# Patient Record
Sex: MALE | Race: OTHER | NOT HISPANIC OR LATINO | URBAN - METROPOLITAN AREA
[De-identification: names, ages, dates, MRNs, and addresses within clinical notes are randomized per-mention and may not be internally consistent; named-entity substitution may affect disease eponyms.]

---

## 2017-09-14 PROBLEM — Z00.00 ENCOUNTER FOR PREVENTIVE HEALTH EXAMINATION: Status: ACTIVE | Noted: 2017-09-14

## 2019-04-13 ENCOUNTER — EMERGENCY (EMERGENCY)
Facility: HOSPITAL | Age: 44
LOS: 1 days | Discharge: HOME | End: 2019-04-13
Attending: EMERGENCY MEDICINE
Payer: COMMERCIAL

## 2019-04-13 VITALS
RESPIRATION RATE: 16 BRPM | OXYGEN SATURATION: 98 % | TEMPERATURE: 98 F | SYSTOLIC BLOOD PRESSURE: 130 MMHG | HEART RATE: 66 BPM | DIASTOLIC BLOOD PRESSURE: 78 MMHG

## 2019-04-13 VITALS
HEART RATE: 90 BPM | OXYGEN SATURATION: 98 % | DIASTOLIC BLOOD PRESSURE: 76 MMHG | RESPIRATION RATE: 20 BRPM | SYSTOLIC BLOOD PRESSURE: 124 MMHG | WEIGHT: 169.98 LBS | TEMPERATURE: 98 F

## 2019-04-13 LAB
ALBUMIN SERPL ELPH-MCNC: 4.9 G/DL — SIGNIFICANT CHANGE UP (ref 3.3–5.2)
ALP SERPL-CCNC: 88 U/L — SIGNIFICANT CHANGE UP (ref 40–120)
ALT FLD-CCNC: 19 U/L — SIGNIFICANT CHANGE UP
ANION GAP SERPL CALC-SCNC: 14 MMOL/L — SIGNIFICANT CHANGE UP (ref 5–17)
APPEARANCE UR: CLEAR — SIGNIFICANT CHANGE UP
APTT BLD: 31.4 SEC — SIGNIFICANT CHANGE UP (ref 27.5–36.3)
AST SERPL-CCNC: 21 U/L — SIGNIFICANT CHANGE UP
BACTERIA # UR AUTO: ABNORMAL
BASOPHILS # BLD AUTO: 0 K/UL — SIGNIFICANT CHANGE UP (ref 0–0.2)
BASOPHILS NFR BLD AUTO: 0.2 % — SIGNIFICANT CHANGE UP (ref 0–2)
BILIRUB SERPL-MCNC: 0.8 MG/DL — SIGNIFICANT CHANGE UP (ref 0.4–2)
BILIRUB UR-MCNC: NEGATIVE — SIGNIFICANT CHANGE UP
BUN SERPL-MCNC: 16 MG/DL — SIGNIFICANT CHANGE UP (ref 8–20)
CALCIUM SERPL-MCNC: 9.6 MG/DL — SIGNIFICANT CHANGE UP (ref 8.6–10.2)
CHLORIDE SERPL-SCNC: 101 MMOL/L — SIGNIFICANT CHANGE UP (ref 98–107)
CO2 SERPL-SCNC: 25 MMOL/L — SIGNIFICANT CHANGE UP (ref 22–29)
COLOR SPEC: YELLOW — SIGNIFICANT CHANGE UP
CREAT SERPL-MCNC: 0.99 MG/DL — SIGNIFICANT CHANGE UP (ref 0.5–1.3)
DIFF PNL FLD: NEGATIVE — SIGNIFICANT CHANGE UP
EOSINOPHIL # BLD AUTO: 0 K/UL — SIGNIFICANT CHANGE UP (ref 0–0.5)
EOSINOPHIL NFR BLD AUTO: 0.4 % — SIGNIFICANT CHANGE UP (ref 0–5)
EPI CELLS # UR: SIGNIFICANT CHANGE UP
GLUCOSE SERPL-MCNC: 95 MG/DL — SIGNIFICANT CHANGE UP (ref 70–115)
GLUCOSE UR QL: NEGATIVE MG/DL — SIGNIFICANT CHANGE UP
HCT VFR BLD CALC: 44.8 % — SIGNIFICANT CHANGE UP (ref 42–52)
HGB BLD-MCNC: 15.3 G/DL — SIGNIFICANT CHANGE UP (ref 14–18)
INR BLD: 1.25 RATIO — HIGH (ref 0.88–1.16)
KETONES UR-MCNC: ABNORMAL
LEUKOCYTE ESTERASE UR-ACNC: NEGATIVE — SIGNIFICANT CHANGE UP
LYMPHOCYTES # BLD AUTO: 1.8 K/UL — SIGNIFICANT CHANGE UP (ref 1–4.8)
LYMPHOCYTES # BLD AUTO: 19 % — LOW (ref 20–55)
MCHC RBC-ENTMCNC: 31.6 PG — HIGH (ref 27–31)
MCHC RBC-ENTMCNC: 34.2 G/DL — SIGNIFICANT CHANGE UP (ref 32–36)
MCV RBC AUTO: 92.6 FL — SIGNIFICANT CHANGE UP (ref 80–94)
MONOCYTES # BLD AUTO: 1.2 K/UL — HIGH (ref 0–0.8)
MONOCYTES NFR BLD AUTO: 12.5 % — HIGH (ref 3–10)
NEUTROPHILS # BLD AUTO: 6.4 K/UL — SIGNIFICANT CHANGE UP (ref 1.8–8)
NEUTROPHILS NFR BLD AUTO: 67.7 % — SIGNIFICANT CHANGE UP (ref 37–73)
NITRITE UR-MCNC: NEGATIVE — SIGNIFICANT CHANGE UP
PH UR: 6 — SIGNIFICANT CHANGE UP (ref 5–8)
PLATELET # BLD AUTO: 248 K/UL — SIGNIFICANT CHANGE UP (ref 150–400)
POTASSIUM SERPL-MCNC: 4.3 MMOL/L — SIGNIFICANT CHANGE UP (ref 3.5–5.3)
POTASSIUM SERPL-SCNC: 4.3 MMOL/L — SIGNIFICANT CHANGE UP (ref 3.5–5.3)
PROT SERPL-MCNC: 7.9 G/DL — SIGNIFICANT CHANGE UP (ref 6.6–8.7)
PROT UR-MCNC: 15 MG/DL
PROTHROM AB SERPL-ACNC: 14.5 SEC — HIGH (ref 10–12.9)
RBC # BLD: 4.84 M/UL — SIGNIFICANT CHANGE UP (ref 4.6–6.2)
RBC # FLD: 12.3 % — SIGNIFICANT CHANGE UP (ref 11–15.6)
RBC CASTS # UR COMP ASSIST: SIGNIFICANT CHANGE UP /HPF (ref 0–4)
SODIUM SERPL-SCNC: 140 MMOL/L — SIGNIFICANT CHANGE UP (ref 135–145)
SP GR SPEC: 1.01 — SIGNIFICANT CHANGE UP (ref 1.01–1.02)
TROPONIN T SERPL-MCNC: <0.01 NG/ML — SIGNIFICANT CHANGE UP (ref 0–0.06)
UROBILINOGEN FLD QL: NEGATIVE MG/DL — SIGNIFICANT CHANGE UP
WBC # BLD: 9.4 K/UL — SIGNIFICANT CHANGE UP (ref 4.8–10.8)
WBC # FLD AUTO: 9.4 K/UL — SIGNIFICANT CHANGE UP (ref 4.8–10.8)
WBC UR QL: SIGNIFICANT CHANGE UP

## 2019-04-13 PROCEDURE — 70450 CT HEAD/BRAIN W/O DYE: CPT | Mod: 26

## 2019-04-13 PROCEDURE — 85730 THROMBOPLASTIN TIME PARTIAL: CPT

## 2019-04-13 PROCEDURE — 93005 ELECTROCARDIOGRAM TRACING: CPT

## 2019-04-13 PROCEDURE — 84484 ASSAY OF TROPONIN QUANT: CPT

## 2019-04-13 PROCEDURE — 99284 EMERGENCY DEPT VISIT MOD MDM: CPT

## 2019-04-13 PROCEDURE — 93010 ELECTROCARDIOGRAM REPORT: CPT

## 2019-04-13 PROCEDURE — 81001 URINALYSIS AUTO W/SCOPE: CPT

## 2019-04-13 PROCEDURE — 85610 PROTHROMBIN TIME: CPT

## 2019-04-13 PROCEDURE — 71045 X-RAY EXAM CHEST 1 VIEW: CPT

## 2019-04-13 PROCEDURE — 85027 COMPLETE CBC AUTOMATED: CPT

## 2019-04-13 PROCEDURE — 36415 COLL VENOUS BLD VENIPUNCTURE: CPT

## 2019-04-13 PROCEDURE — 70450 CT HEAD/BRAIN W/O DYE: CPT

## 2019-04-13 PROCEDURE — 71045 X-RAY EXAM CHEST 1 VIEW: CPT | Mod: 26

## 2019-04-13 PROCEDURE — 99284 EMERGENCY DEPT VISIT MOD MDM: CPT | Mod: 25

## 2019-04-13 PROCEDURE — 80053 COMPREHEN METABOLIC PANEL: CPT

## 2019-04-13 RX ORDER — SODIUM CHLORIDE 9 MG/ML
1000 INJECTION INTRAMUSCULAR; INTRAVENOUS; SUBCUTANEOUS ONCE
Qty: 0 | Refills: 0 | Status: COMPLETED | OUTPATIENT
Start: 2019-04-13 | End: 2019-04-13

## 2019-04-13 RX ORDER — SODIUM CHLORIDE 9 MG/ML
3 INJECTION INTRAMUSCULAR; INTRAVENOUS; SUBCUTANEOUS ONCE
Qty: 0 | Refills: 0 | Status: COMPLETED | OUTPATIENT
Start: 2019-04-13 | End: 2019-04-13

## 2019-04-13 RX ORDER — MECLIZINE HCL 12.5 MG
25 TABLET ORAL ONCE
Qty: 0 | Refills: 0 | Status: COMPLETED | OUTPATIENT
Start: 2019-04-13 | End: 2019-04-13

## 2019-04-13 RX ADMIN — Medication 25 MILLIGRAM(S): at 15:55

## 2019-04-13 RX ADMIN — SODIUM CHLORIDE 1000 MILLILITER(S): 9 INJECTION INTRAMUSCULAR; INTRAVENOUS; SUBCUTANEOUS at 14:30

## 2019-04-13 RX ADMIN — SODIUM CHLORIDE 1000 MILLILITER(S): 9 INJECTION INTRAMUSCULAR; INTRAVENOUS; SUBCUTANEOUS at 13:45

## 2019-04-13 RX ADMIN — SODIUM CHLORIDE 3 MILLILITER(S): 9 INJECTION INTRAMUSCULAR; INTRAVENOUS; SUBCUTANEOUS at 15:56

## 2019-04-13 NOTE — ED ADULT TRIAGE NOTE - CHIEF COMPLAINT QUOTE
pt reports dizziness and tingling to his hand during the night and states he thinks he is "saying things backwards." pt states symptoms x few days. pt is AOX3, no chest pain no tingling to extremities at this time. pt ambulatory with steady gait and no slurred speech.

## 2019-04-13 NOTE — ED STATDOCS - PROGRESS NOTE DETAILS
Pt signed out by HAY Maddox pending CT and EKG. Pt feeling better labs and CT wnl. Will dc with PCP f/u.

## 2019-04-13 NOTE — ED STATDOCS - ATTENDING CONTRIBUTION TO CARE
I, Maico Mixon, performed the initial face to face bedside interview with this patient regarding history of present illness, review of symptoms and relevant past medical, social and family history.  I completed an independent physical examination.  I was the initial provider who evaluated this patient. I have signed out the follow up of any pending tests (i.e. labs, radiological studies) to the ACP.  I have communicated the patient’s plan of care and disposition with the ACP.  The history, relevant review of systems, past medical and surgical history, medical decision making, and physical examination was documented by the scribe in my presence and I attest to the accuracy of the documentation.
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
## 2019-04-13 NOTE — ED STATDOCS - CLINICAL SUMMARY MEDICAL DECISION MAKING FREE TEXT BOX
43 y/o M pt with multiple complaints of dizziness worse with certain position intermittently, forgetfulness, and tingling of hand sensations, will obtain labs, CT, and re-evaluate.

## 2024-08-06 ENCOUNTER — NON-APPOINTMENT (OUTPATIENT)
Age: 49
End: 2024-08-06

## 2025-01-05 ENCOUNTER — NON-APPOINTMENT (OUTPATIENT)
Age: 50
End: 2025-01-05

## 2025-01-17 NOTE — ED ADULT NURSE NOTE - NS ED NURSE LEVEL OF CONSCIOUSNESS MENTAL STATUS
Patient: Joesph Landeros Date: 2025  : 1944 Attending: Irina Junior MD  80 year old male     Reason for consult: syncope    Chief Complaint: Syncope     LVEF: normal     - asked ot see again about orthostasis, had BP meds this AM       Orthostatics below. Pt denied dizziness throughout position changes.        25 0443 25 0445 25 0445   Heart Rate   Heart Rate 78 84 73   Blood Pressure   BP (!) 166/77 109/65 (!) 89/61   MAP (mmHg) (!) 107 79 (!) 70   Patient Position Supine Sitting Standing   Respirations/Oxygenation   SpO2 93 % 95 % 95 %              Vitals Last Value 24 Hour Range  Temperature 97.9 °F (36.6 °C) Temp  Min: 97.9 °F (36.6 °C)  Max: 98.6 °F (37 °C)  Pulse 85 Pulse  Min: 69  Max: 87  Respiratory 16 Resp  Min: 16  Max: 16  Blood Pressure (!) 148/65 BP  Min: 89/61  Max: 166/77  Arterial BP   No data recorded  Pulse Oximetry 97 % SpO2  Min: 93 %  Max: 97 %    Vitals Today Admission  Weight 83.5 kg (184 lb 1.4 oz) Weight: 81.8 kg (180 lb 5.4 oz)    Weight over the past 48 Hours:  Patient Vitals for the past 48 hrs:   Weight   25 0500 83 kg (182 lb 15.7 oz)   25 0631 83.5 kg (184 lb 1.4 oz)        Intake/Output:    I/O last 3 completed shifts:  In: 200 [P.O.:200]  Out: -       Intake/Output Summary (Last 24 hours) at 2025 1753  Last data filed at 2025 1000  Gross per 24 hour   Intake 200 ml   Output --   Net 200 ml       Rhythm: Normal Sinus Rhythm    Medications/Infusions:  Scheduled:   Current Facility-Administered Medications   Medication Dose Route Frequency Provider Last Rate Last Admin    insulin lispro (ADMELOG,HumaLOG) - Correction Dose   Subcutaneous TID WC Amee Douglas CNP   6 Units at 25 1302    insulin lispro (ADMELOG,HumaLOG) - Correction Dose   Subcutaneous Nightly Amee Douglas CNP        midodrine (PROAMATINE) tablet 2.5 mg  2.5 mg Oral TID Amee Hernandez CNP        allopurinol (ZYLOPRIM) tablet 100 mg  100 mg Oral  Daily Amee Douglas CNP   100 mg at 01/17/25 0833    aspirin (ECOTRIN) enteric coated tablet 81 mg  81 mg Oral Daily Amee Douglas CNP   81 mg at 01/17/25 0833    busPIRone (BUSPAR) tablet 15 mg  15 mg Oral Daily Amee Douglas CNP   15 mg at 01/17/25 0833    latanoprost (XALATAN) 0.005 % ophthalmic solution 1 drop  1 drop Both Eyes Nightly Amee Douglas CNP   1 drop at 01/16/25 2023    levothyroxine (SYNTHROID, LEVOTHROID) tablet 200 mcg  200 mcg Oral Daily Amee Douglas CNP   200 mcg at 01/17/25 0505    loratadine (CLARITIN) tablet 10 mg  10 mg Oral Daily Amee Douglas CNP   10 mg at 01/17/25 0833    losartan (COZAAR) tablet 100 mg  100 mg Oral Daily Amee Douglas CNP   100 mg at 01/17/25 0833    metoPROLOL succinate (TOPROL-XL) ER tablet 100 mg  100 mg Oral Daily Amee Douglas CNP   100 mg at 01/17/25 0833    atorvastatin (LIPITOR) tablet 10 mg  10 mg Oral Nightly Amee Douglas CNP   10 mg at 01/16/25 2023    venlafaxine XR (EFFEXOR XR) 24 hr capsule 300 mg  300 mg Oral Once per day on Monday Wednesday Friday Amee Douglas CNP   300 mg at 01/17/25 0850    venlafaxine XR (EFFEXOR XR) 24 hr capsule 450 mg  450 mg Oral Once per day on Sunday Tuesday Thursday Saturday Amee Douglas CNP   450 mg at 01/16/25 0858    sodium chloride 0.9 % injection 2 mL  2 mL Intracatheter 2 times per day Rakesh Braga CNP   2 mL at 01/17/25 0834        Physical Exam:   General Appearance: alert and no distress  Heart: regular rate and rhythm, S1, S2 normal, no murmur, click, rub or gallop  Lungs: clear to auscultation bilaterally  Abdomen:  soft, non tender, non distended  Extremities: extremities normal, atraumatic, no cyanosis or edema  Pulses: +2 Bilateral Posterior Tibialis and +2 Bilateral Dorsalis Pedis  Neurological: Alert and oriented X 3, normal strength and tone. Normal symmetric reflexes. Normal coordination and gait    Laboratory Results:    Recent Labs   Lab 01/17/25  0548 01/16/25  0754  01/15/25  1025 01/15/25  0917 01/13/25  0603 01/12/25  1436   WBC 10.9 11.5*  --  12.4*   < > 13.5*   HCT 29.8* 30.5*  --  31.8*   < > 32.7*   HGB 9.7* 9.8*  --  10.0*   < > 10.6*    280  --  286   < > 348   INR  --   --   --   --   --  1.0   PTT  --   --   --   --   --  23   SODIUM 135 138  --  136   < > 139   POTASSIUM 4.4 4.3  --  4.5   < > 4.3   CHLORIDE 108 112*  --  109   < > 107   CO2 23 23  --  21   < > 24   GLUCOSE 195* 191*  --  233*   < > 110*   BUN 28* 32*  --  34*   < > 43*   CREATININE 1.73* 1.87*  --  2.06*   < > 2.59*   TSH  --   --  0.801  --   --   --    HTROPI  --   --   --   --   --  6    < > = values in this interval not displayed.         Imaging:  NM LUNG PERFUSION IMAGING   Final Result by Jennifer Zambrano MD (01/12 2051)      Low probability for pulmonary embolism.               Electronically Signed by: JENNIFER ZAMBRANO M.D.    Signed on: 1/12/2025 8:51 PM    Workstation ID: [id number]       VASC LOWER EXTREMITY VENOUS DUPLEX RIGHT   Final Result by Tiny Hauser MD (01/12 9486)   1.   No evidence of deep venous thrombosis.           Electronically Signed by: TINY HAUSER M.D.    Signed on: 1/12/2025 3:32 PM    Workstation ID: [id number]      XR CHEST AP OR PA   Final Result by Tiny Hauser MD (01/12 6986)   1.   Small amount linear right basilar scarring with no acute   cardiopulmonary disease identified.            Electronically Signed by: TINY HAUSER M.D.    Signed on: 1/12/2025 2:48 PM    Workstation ID: [id number]        Office echocardiogram January 2025-ejection fraction 60%     Telemetry shows sinus rhythm       ASSESSMENT     80-year-old male with hypertension and chronic kidney disease presents with cough found to be COVID-positive and syncope at home     Syncope likely some orthostatic hypotension related to viral illness and medications. He was possibly slightly dehydrated but has been drinking lots of Diet Coke at home as usual.  Alert/Awake/Cooperative Blood pressures between 106-160 systolic.   Possible exacerbation of Autonomic dysfunction from COVID     Mildly elevated BNP, nonspecific at 1700. Recent LV Ejection fraction 60% in the office    IMTIAZ on CKD. Cr initially 2.5 on admission. Baseline Cr 1.9-2.3. This may also cause his BNP to be elevated     History of coronary artery disease, diabetes, hypertension     Recent dog scratch with some wounds on his left arm      PLAN     Recent office echocardiogram was normal    Decrease ARB    HAYDE hose    Midodrine if needed    Try to keep standing BP > 100  - will need to accept supine HTN    ? Home  1 to 2 days      Jaime Dooley, DO      Note to Patient: The 21st Century Cures Act makes medical notes like these available to patients in the interest of transparency. However, be advised this is a medical document. It is intended as peer to peer communication. It is written in medical language and may contain abbreviations or verbiage that are unfamiliar. It may appear blunt or direct. Medical documents are intended to carry relevant information, facts as evident, and the clinical opinion of the practitioner.       This note may have been transcribed using a voice dictation system. Voice recognition errors may occur. This should not be taken to alter the content or meaning of this note.

## 2025-01-30 ENCOUNTER — NON-APPOINTMENT (OUTPATIENT)
Age: 50
End: 2025-01-30